# Patient Record
Sex: FEMALE | Race: WHITE | NOT HISPANIC OR LATINO | URBAN - METROPOLITAN AREA
[De-identification: names, ages, dates, MRNs, and addresses within clinical notes are randomized per-mention and may not be internally consistent; named-entity substitution may affect disease eponyms.]

---

## 2021-04-28 LAB
D AG BLD QL: POSITIVE
EXTERNAL ABO: NORMAL
HBV SURFACE AG SER QL: NONREACTIVE
RUBELLA IGG SCREEN: NORMAL

## 2021-10-13 ENCOUNTER — HOSPITAL ENCOUNTER (OUTPATIENT)
Facility: HOSPITAL | Age: 31
End: 2021-10-13
Attending: OBSTETRICS & GYNECOLOGY | Admitting: OBSTETRICS & GYNECOLOGY
Payer: COMMERCIAL

## 2021-11-18 LAB — GP B STREP SPEC QL CULT: NORMAL

## 2021-12-05 ENCOUNTER — PREP FOR CASE (OUTPATIENT)
Dept: SCHEDULING | Age: 31
End: 2021-12-05
Payer: COMMERCIAL

## 2021-12-05 RX ORDER — ACETAMINOPHEN 650 MG/1
650 SUPPOSITORY RECTAL ONCE
Status: CANCELLED | OUTPATIENT
Start: 2021-12-05 | End: 2021-12-05

## 2021-12-05 RX ORDER — METHYLERGONOVINE MALEATE 0.2 MG/ML
200 INJECTION INTRAVENOUS ONCE AS NEEDED
Status: CANCELLED | OUTPATIENT
Start: 2021-12-05

## 2021-12-05 RX ORDER — SODIUM CITRATE AND CITRIC ACID MONOHYDRATE 334; 500 MG/5ML; MG/5ML
30 SOLUTION ORAL ONCE
Status: CANCELLED | OUTPATIENT
Start: 2021-12-05 | End: 2021-12-05

## 2021-12-05 RX ORDER — OXYTOCIN/0.9 % SODIUM CHLORIDE 40/1000ML
PLASTIC BAG, INJECTION (ML) INTRAVENOUS CONTINUOUS
Status: CANCELLED | OUTPATIENT
Start: 2021-12-05 | End: 2021-12-05

## 2021-12-05 RX ORDER — OXYTOCIN/0.9 % SODIUM CHLORIDE 40/1000ML
500 PLASTIC BAG, INJECTION (ML) INTRAVENOUS ONCE
Status: CANCELLED | OUTPATIENT
Start: 2021-12-05 | End: 2021-12-05

## 2021-12-05 RX ORDER — ACETAMINOPHEN 325 MG/1
975 TABLET ORAL ONCE
Status: CANCELLED | OUTPATIENT
Start: 2021-12-05 | End: 2021-12-05

## 2021-12-05 RX ORDER — SODIUM CHLORIDE, SODIUM LACTATE, POTASSIUM CHLORIDE, CALCIUM CHLORIDE 600; 310; 30; 20 MG/100ML; MG/100ML; MG/100ML; MG/100ML
150 INJECTION, SOLUTION INTRAVENOUS CONTINUOUS
Status: CANCELLED | OUTPATIENT
Start: 2021-12-05 | End: 2021-12-12

## 2021-12-05 RX ORDER — OXYTOCIN 10 [USP'U]/ML
10 INJECTION, SOLUTION INTRAMUSCULAR; INTRAVENOUS ONCE AS NEEDED
Status: CANCELLED | OUTPATIENT
Start: 2021-12-05

## 2021-12-05 RX ORDER — MISOPROSTOL 100 UG/1
1000 TABLET ORAL ONCE AS NEEDED
Status: CANCELLED | OUTPATIENT
Start: 2021-12-05

## 2021-12-05 RX ORDER — CARBOPROST TROMETHAMINE 250 UG/ML
250 INJECTION, SOLUTION INTRAMUSCULAR ONCE AS NEEDED
Status: CANCELLED | OUTPATIENT
Start: 2021-12-05

## 2021-12-05 RX ORDER — SODIUM CHLORIDE, SODIUM LACTATE, POTASSIUM CHLORIDE, CALCIUM CHLORIDE 600; 310; 30; 20 MG/100ML; MG/100ML; MG/100ML; MG/100ML
80 INJECTION, SOLUTION INTRAVENOUS CONTINUOUS
Status: CANCELLED | OUTPATIENT
Start: 2021-12-05 | End: 2021-12-06

## 2021-12-05 RX ORDER — ONDANSETRON 4 MG/1
8 TABLET, ORALLY DISINTEGRATING ORAL ONCE
Status: CANCELLED | OUTPATIENT
Start: 2021-12-05 | End: 2021-12-05

## 2021-12-05 NOTE — H&P
Labor and Delivery Admission History and Physical    CC: Scheduled c/s    History: Denisse Valdez is a 31 y.o. female  at 39w0d here for scheduled repeat c/s    Prenatal problems:  1. History of c/s for macrosomia- desires repeat, declines sterilization  2. A1GDM  3. SMA carrier, FOB neg  4. Elevated AFP- normal growth scans  5. Angioedema to tylenol  6. LGA fetus    OB History:   OB History    Para Term  AB Living   1 0 0 0 0 0   SAB IAB Ectopic Multiple Live Births   0 0 0 0 0      # Outcome Date GA Lbr José Miguel/2nd Weight Sex Delivery Anes PTL Lv   1 Current                Medical History: No past medical history on file.    Surgical History: C/S x 1    Social History:   Social History     Socioeconomic History   • Marital status:      Spouse name: Not on file   • Number of children: Not on file   • Years of education: Not on file   • Highest education level: Not on file   Occupational History   • Not on file   Tobacco Use   • Smoking status: Not on file   • Smokeless tobacco: Not on file   Substance and Sexual Activity   • Alcohol use: Not on file   • Drug use: Not on file   • Sexual activity: Not on file   Other Topics Concern   • Not on file   Social History Narrative   • Not on file     Social Determinants of Health     Financial Resource Strain: Not on file   Food Insecurity: Not on file   Transportation Needs: Not on file   Physical Activity: Not on file   Stress: Not on file   Social Connections: Not on file   Intimate Partner Violence: Not on file   Housing Stability: Not on file        Family History: No family history on file.    Allergies: Patient has no allergy information on record.    Prior to Admission medications    Not on File       Review of Systems  Pertinent items are noted in HPI.    Objective     Vital Signs for the last 24 hours:  PDNING    Fetal Monitoring:  PENDING    Exam:  General Appearance: Alert, cooperative, no acute distress  Lungs: Unlabored  breathing  Heart:: RRR  Abdomen: gravid, nontender  Extremities: no edema or calf tenderness  Neurologic: grossly intact without focal deficits    Ultrasounds:   I have reviewed the applicable Ultrasounds.      Labs:  See results console for full labs    Rubella: immune  Rh: pos  GBS: neg    Assessment/Plan     Denisse Valdez is a 31 y.o. female  at 39w0d here for scheduled repeat c/s, LGA fetus. Risks of infection, bleeding, damage to internal organs like bowel, bladder and ureter reviewed. She declines permanent sterilization.    Ancef  Bicitra  Avoid tylenol due to angioedema      Fanny Gonzalez MD

## 2021-12-06 ENCOUNTER — HOSPITAL ENCOUNTER (INPATIENT)
Facility: HOSPITAL | Age: 31
LOS: 2 days | Discharge: HOME | End: 2021-12-08
Payer: COMMERCIAL

## 2021-12-06 ENCOUNTER — ANESTHESIA EVENT (INPATIENT)
Dept: OBSTETRICS AND GYNECOLOGY | Facility: HOSPITAL | Age: 31
End: 2021-12-06
Payer: COMMERCIAL

## 2021-12-06 ENCOUNTER — ANESTHESIA (INPATIENT)
Dept: OBSTETRICS AND GYNECOLOGY | Facility: HOSPITAL | Age: 31
End: 2021-12-06
Payer: COMMERCIAL

## 2021-12-06 DIAGNOSIS — O34.211 MATERNAL CARE DUE TO LOW TRANSVERSE UTERINE SCAR FROM PREVIOUS CESAREAN DELIVERY: ICD-10-CM

## 2021-12-06 PROBLEM — Z98.891 HISTORY OF CESAREAN DELIVERY: Status: ACTIVE | Noted: 2021-12-06

## 2021-12-06 LAB
ABO + RH BLD: NORMAL
AMPHET UR QL SCN: NOT DETECTED
BARBITURATES UR QL SCN: NOT DETECTED
BENZODIAZ UR QL SCN: NOT DETECTED
BLD GP AB SCN SERPL QL: NEGATIVE
BUPRENORPHINE UR QL: NOT DETECTED
CANNABINOIDS UR QL SCN: NOT DETECTED
COCAINE UR QL SCN: NOT DETECTED
D AG BLD QL: POSITIVE
ERYTHROCYTE [DISTWIDTH] IN BLOOD BY AUTOMATED COUNT: 15.9 % (ref 11.7–14.4)
ETHANOL UR-MCNC: NEGATIVE MG/DL
GLUCOSE BLD-MCNC: 103 MG/DL (ref 70–99)
HCT VFR BLDCO AUTO: 31 % (ref 35–45)
HGB BLD-MCNC: 9.3 G/DL (ref 11.8–15.7)
LABORATORY COMMENT REPORT: NORMAL
MCH RBC QN AUTO: 24.1 PG (ref 28–33.2)
MCHC RBC AUTO-ENTMCNC: 30 G/DL (ref 32.2–35.5)
MCV RBC AUTO: 80.3 FL (ref 83–98)
METHADONE UR QL SCN: NOT DETECTED
OPIATES UR QL SCN: NOT DETECTED
OXYCODONE UR QL SCN: NOT DETECTED
PCP UR QL SCN: NOT DETECTED
PDW BLD AUTO: 10.6 FL (ref 9.4–12.3)
PLATELET # BLD AUTO: 261 K/UL (ref 150–369)
POCT TEST: ABNORMAL
RBC # BLD AUTO: 3.86 M/UL (ref 3.93–5.22)
SARS-COV-2 RNA RESP QL NAA+PROBE: NEGATIVE
SPECIMEN EXP DATE BLD: NORMAL
WBC # BLD AUTO: 9.32 K/UL (ref 3.8–10.5)

## 2021-12-06 PROCEDURE — 80307 DRUG TEST PRSMV CHEM ANLYZR: CPT

## 2021-12-06 PROCEDURE — 25800000 HC PHARMACY IV SOLUTIONS: Performed by: STUDENT IN AN ORGANIZED HEALTH CARE EDUCATION/TRAINING PROGRAM

## 2021-12-06 PROCEDURE — 25000000 HC PHARMACY GENERAL

## 2021-12-06 PROCEDURE — 86780 TREPONEMA PALLIDUM: CPT

## 2021-12-06 PROCEDURE — 63600000 HC DRUGS/DETAIL CODE

## 2021-12-06 PROCEDURE — 63700000 HC SELF-ADMINISTRABLE DRUG

## 2021-12-06 PROCEDURE — U0002 COVID-19 LAB TEST NON-CDC: HCPCS

## 2021-12-06 PROCEDURE — 71000011 HC PACU PHASE 1 EA ADDL MIN

## 2021-12-06 PROCEDURE — 71000001 HC PACU PHASE 1 INITIAL 30MIN

## 2021-12-06 PROCEDURE — 0UB10ZZ EXCISION OF LEFT OVARY, OPEN APPROACH: ICD-10-PCS

## 2021-12-06 PROCEDURE — 37000010 HC ANESTHESIA SPINAL

## 2021-12-06 PROCEDURE — 63600000 HC DRUGS/DETAIL CODE: Performed by: STUDENT IN AN ORGANIZED HEALTH CARE EDUCATION/TRAINING PROGRAM

## 2021-12-06 PROCEDURE — 88307 TISSUE EXAM BY PATHOLOGIST: CPT

## 2021-12-06 PROCEDURE — 36415 COLL VENOUS BLD VENIPUNCTURE: CPT

## 2021-12-06 PROCEDURE — 25800000 HC PHARMACY IV SOLUTIONS

## 2021-12-06 PROCEDURE — 72000021 HC C SECTION LEVEL 1

## 2021-12-06 PROCEDURE — 12000000 HC ROOM AND CARE MED/SURG

## 2021-12-06 PROCEDURE — 25000000 HC PHARMACY GENERAL: Performed by: STUDENT IN AN ORGANIZED HEALTH CARE EDUCATION/TRAINING PROGRAM

## 2021-12-06 PROCEDURE — G0480 DRUG TEST DEF 1-7 CLASSES: HCPCS

## 2021-12-06 PROCEDURE — 85027 COMPLETE CBC AUTOMATED: CPT

## 2021-12-06 PROCEDURE — 27200121 HC CATH FOLEY

## 2021-12-06 PROCEDURE — 86901 BLOOD TYPING SEROLOGIC RH(D): CPT

## 2021-12-06 RX ORDER — ONDANSETRON 8 MG/1
8 TABLET, ORALLY DISINTEGRATING ORAL ONCE
Status: DISCONTINUED | OUTPATIENT
Start: 2021-12-06 | End: 2021-12-06

## 2021-12-06 RX ORDER — AMOXICILLIN 250 MG
1 CAPSULE ORAL 2 TIMES DAILY
Status: DISCONTINUED | OUTPATIENT
Start: 2021-12-06 | End: 2021-12-08 | Stop reason: HOSPADM

## 2021-12-06 RX ORDER — ALUMINUM HYDROXIDE, MAGNESIUM HYDROXIDE, AND SIMETHICONE 1200; 120; 1200 MG/30ML; MG/30ML; MG/30ML
30 SUSPENSION ORAL EVERY 4 HOURS PRN
Status: DISCONTINUED | OUTPATIENT
Start: 2021-12-06 | End: 2021-12-08 | Stop reason: HOSPADM

## 2021-12-06 RX ORDER — SODIUM CITRATE AND CITRIC ACID MONOHYDRATE 334; 500 MG/5ML; MG/5ML
30 SOLUTION ORAL ONCE
Status: COMPLETED | OUTPATIENT
Start: 2021-12-06 | End: 2021-12-06

## 2021-12-06 RX ORDER — DIPHENHYDRAMINE HCL 50 MG/ML
25 VIAL (ML) INJECTION EVERY 6 HOURS PRN
Status: DISCONTINUED | OUTPATIENT
Start: 2021-12-06 | End: 2021-12-08 | Stop reason: HOSPADM

## 2021-12-06 RX ORDER — SODIUM CHLORIDE, SODIUM GLUCONATE, SODIUM ACETATE, POTASSIUM CHLORIDE AND MAGNESIUM CHLORIDE 30; 37; 368; 526; 502 MG/100ML; MG/100ML; MG/100ML; MG/100ML; MG/100ML
INJECTION, SOLUTION INTRAVENOUS CONTINUOUS PRN
Status: DISCONTINUED | OUTPATIENT
Start: 2021-12-06 | End: 2021-12-06 | Stop reason: SURG

## 2021-12-06 RX ORDER — DEXAMETHASONE SODIUM PHOSPHATE 4 MG/ML
INJECTION, SOLUTION INTRA-ARTICULAR; INTRALESIONAL; INTRAMUSCULAR; INTRAVENOUS; SOFT TISSUE AS NEEDED
Status: DISCONTINUED | OUTPATIENT
Start: 2021-12-06 | End: 2021-12-06 | Stop reason: SURG

## 2021-12-06 RX ORDER — DIPHENHYDRAMINE HCL 25 MG
25 CAPSULE ORAL EVERY 6 HOURS PRN
Status: DISCONTINUED | OUTPATIENT
Start: 2021-12-06 | End: 2021-12-08 | Stop reason: HOSPADM

## 2021-12-06 RX ORDER — CALCIUM CARBONATE 200(500)MG
500 TABLET,CHEWABLE ORAL EVERY 4 HOURS PRN
Status: DISCONTINUED | OUTPATIENT
Start: 2021-12-06 | End: 2021-12-08 | Stop reason: HOSPADM

## 2021-12-06 RX ORDER — PROCHLORPERAZINE EDISYLATE 5 MG/ML
10 INJECTION INTRAMUSCULAR; INTRAVENOUS EVERY 6 HOURS PRN
Status: ACTIVE | OUTPATIENT
Start: 2021-12-06 | End: 2021-12-07

## 2021-12-06 RX ORDER — OXYTOCIN/0.9 % SODIUM CHLORIDE 40/1000ML
PLASTIC BAG, INJECTION (ML) INTRAVENOUS CONTINUOUS
Status: DISCONTINUED | OUTPATIENT
Start: 2021-12-06 | End: 2021-12-06 | Stop reason: HOSPADM

## 2021-12-06 RX ORDER — ONDANSETRON HYDROCHLORIDE 2 MG/ML
4 INJECTION, SOLUTION INTRAVENOUS EVERY 8 HOURS PRN
Status: DISCONTINUED | OUTPATIENT
Start: 2021-12-06 | End: 2021-12-08 | Stop reason: HOSPADM

## 2021-12-06 RX ORDER — ONDANSETRON 4 MG/1
4 TABLET, ORALLY DISINTEGRATING ORAL EVERY 8 HOURS PRN
Status: DISCONTINUED | OUTPATIENT
Start: 2021-12-06 | End: 2021-12-08 | Stop reason: HOSPADM

## 2021-12-06 RX ORDER — NALOXONE HYDROCHLORIDE 0.4 MG/ML
0.04 INJECTION, SOLUTION INTRAMUSCULAR; INTRAVENOUS; SUBCUTANEOUS AS NEEDED
Status: ACTIVE | OUTPATIENT
Start: 2021-12-06 | End: 2021-12-07

## 2021-12-06 RX ORDER — ONDANSETRON HYDROCHLORIDE 2 MG/ML
4 INJECTION, SOLUTION INTRAVENOUS EVERY 6 HOURS PRN
Status: ACTIVE | OUTPATIENT
Start: 2021-12-06 | End: 2021-12-07

## 2021-12-06 RX ORDER — MORPHINE SULFATE 0.5 MG/ML
INJECTION, SOLUTION EPIDURAL; INTRATHECAL; INTRAVENOUS
Status: COMPLETED | OUTPATIENT
Start: 2021-12-06 | End: 2021-12-06

## 2021-12-06 RX ORDER — CEFAZOLIN SODIUM 2 G/100ML
2 INJECTION, SOLUTION INTRAVENOUS
Status: COMPLETED | OUTPATIENT
Start: 2021-12-06 | End: 2021-12-06

## 2021-12-06 RX ORDER — OXYTOCIN/0.9 % SODIUM CHLORIDE 40/1000ML
500 PLASTIC BAG, INJECTION (ML) INTRAVENOUS ONCE
Status: COMPLETED | OUTPATIENT
Start: 2021-12-06 | End: 2021-12-06

## 2021-12-06 RX ORDER — BUPIVACAINE HYDROCHLORIDE 7.5 MG/ML
INJECTION, SOLUTION INTRASPINAL
Status: COMPLETED | OUTPATIENT
Start: 2021-12-06 | End: 2021-12-06

## 2021-12-06 RX ORDER — OXYTOCIN 10 [USP'U]/ML
INJECTION, SOLUTION INTRAMUSCULAR; INTRAVENOUS AS NEEDED
Status: DISCONTINUED | OUTPATIENT
Start: 2021-12-06 | End: 2021-12-06 | Stop reason: SURG

## 2021-12-06 RX ORDER — SODIUM CHLORIDE, SODIUM LACTATE, POTASSIUM CHLORIDE, CALCIUM CHLORIDE 600; 310; 30; 20 MG/100ML; MG/100ML; MG/100ML; MG/100ML
150 INJECTION, SOLUTION INTRAVENOUS CONTINUOUS
Status: DISCONTINUED | OUTPATIENT
Start: 2021-12-06 | End: 2021-12-06 | Stop reason: HOSPADM

## 2021-12-06 RX ORDER — ONDANSETRON HYDROCHLORIDE 2 MG/ML
INJECTION, SOLUTION INTRAVENOUS AS NEEDED
Status: DISCONTINUED | OUTPATIENT
Start: 2021-12-06 | End: 2021-12-06 | Stop reason: SURG

## 2021-12-06 RX ORDER — DIPHENHYDRAMINE HCL 50 MG/ML
12.5 VIAL (ML) INJECTION EVERY 6 HOURS PRN
Status: ACTIVE | OUTPATIENT
Start: 2021-12-06 | End: 2021-12-07

## 2021-12-06 RX ORDER — NALOXONE HYDROCHLORIDE 0.4 MG/ML
0.1 INJECTION, SOLUTION INTRAMUSCULAR; INTRAVENOUS; SUBCUTANEOUS EVERY 6 HOURS PRN
Status: ACTIVE | OUTPATIENT
Start: 2021-12-06 | End: 2021-12-07

## 2021-12-06 RX ORDER — KETOROLAC TROMETHAMINE 30 MG/ML
30 INJECTION, SOLUTION INTRAMUSCULAR; INTRAVENOUS
Status: COMPLETED | OUTPATIENT
Start: 2021-12-06 | End: 2021-12-08

## 2021-12-06 RX ORDER — IBUPROFEN 600 MG/1
600 TABLET ORAL
Status: DISCONTINUED | OUTPATIENT
Start: 2021-12-08 | End: 2021-12-08 | Stop reason: HOSPADM

## 2021-12-06 RX ORDER — SODIUM CHLORIDE, SODIUM LACTATE, POTASSIUM CHLORIDE, CALCIUM CHLORIDE 600; 310; 30; 20 MG/100ML; MG/100ML; MG/100ML; MG/100ML
80 INJECTION, SOLUTION INTRAVENOUS CONTINUOUS
Status: DISCONTINUED | OUTPATIENT
Start: 2021-12-06 | End: 2021-12-06 | Stop reason: HOSPADM

## 2021-12-06 RX ORDER — OXYCODONE HYDROCHLORIDE 5 MG/1
5 TABLET ORAL EVERY 4 HOURS PRN
Status: DISCONTINUED | OUTPATIENT
Start: 2021-12-06 | End: 2021-12-08 | Stop reason: HOSPADM

## 2021-12-06 RX ADMIN — CEFAZOLIN SODIUM 2 G: 10 POWDER, FOR SOLUTION INTRAVENOUS at 08:30

## 2021-12-06 RX ADMIN — SODIUM CHLORIDE, SODIUM GLUCONATE, SODIUM ACETATE, POTASSIUM CHLORIDE AND MAGNESIUM CHLORIDE: 526; 502; 368; 37; 30 INJECTION, SOLUTION INTRAVENOUS at 09:05

## 2021-12-06 RX ADMIN — SODIUM CHLORIDE, POTASSIUM CHLORIDE, SODIUM LACTATE AND CALCIUM CHLORIDE 150 ML/HR: 600; 310; 30; 20 INJECTION, SOLUTION INTRAVENOUS at 06:10

## 2021-12-06 RX ADMIN — ONDANSETRON 4 MG: 2 INJECTION INTRAMUSCULAR; INTRAVENOUS at 09:16

## 2021-12-06 RX ADMIN — BUPIVACAINE HYDROCHLORIDE IN DEXTROSE 1.6 ML: 7.5 INJECTION, SOLUTION SUBARACHNOID at 08:40

## 2021-12-06 RX ADMIN — KETOROLAC TROMETHAMINE 30 MG: 30 INJECTION, SOLUTION INTRAMUSCULAR at 22:45

## 2021-12-06 RX ADMIN — DEXAMETHASONE SODIUM PHOSPHATE 4 MG: 4 INJECTION, SOLUTION INTRA-ARTICULAR; INTRALESIONAL; INTRAMUSCULAR; INTRAVENOUS; SOFT TISSUE at 09:16

## 2021-12-06 RX ADMIN — SODIUM CITRATE AND CITRIC ACID MONOHYDRATE 30 ML: 500; 334 SOLUTION ORAL at 08:22

## 2021-12-06 RX ADMIN — SODIUM CHLORIDE, POTASSIUM CHLORIDE, SODIUM LACTATE AND CALCIUM CHLORIDE 80 ML/HR: 600; 310; 30; 20 INJECTION, SOLUTION INTRAVENOUS at 11:54

## 2021-12-06 RX ADMIN — MORPHINE SULFATE 0.2 MG: 0.5 INJECTION, SOLUTION EPIDURAL; INTRATHECAL; INTRAVENOUS at 08:40

## 2021-12-06 RX ADMIN — OXYTOCIN 20 UNITS: 10 INJECTION, SOLUTION INTRAMUSCULAR; INTRAVENOUS at 09:15

## 2021-12-06 RX ADMIN — Medication 20 UNITS: at 09:14

## 2021-12-06 RX ADMIN — SODIUM CHLORIDE 50 MCG/MIN: 9 INJECTION, SOLUTION INTRAVENOUS at 08:35

## 2021-12-06 RX ADMIN — KETOROLAC TROMETHAMINE 30 MG: 30 INJECTION, SOLUTION INTRAMUSCULAR at 10:37

## 2021-12-06 RX ADMIN — SENNOSIDES AND DOCUSATE SODIUM 1 TABLET: 50; 8.6 TABLET ORAL at 20:33

## 2021-12-06 RX ADMIN — KETOROLAC TROMETHAMINE 30 MG: 30 INJECTION, SOLUTION INTRAMUSCULAR at 17:21

## 2021-12-06 ASSESSMENT — PATIENT HEALTH QUESTIONNAIRE - PHQ9: SUM OF ALL RESPONSES TO PHQ9 QUESTIONS 1 & 2: 0

## 2021-12-06 NOTE — ANESTHESIA POSTPROCEDURE EVALUATION
Patient: Denisse Valdez    Procedure Summary     Date: 21 Room / Location:  L&D 1 / [facility] L[facility] OR    Anesthesia Start: 833 Anesthesia Stop: 954    Procedure:  SECTION - JUAREZ 2021 (N/A Abdomen) Diagnosis:       Maternal care due to low transverse uterine scar from previous  delivery      (Repeat O34.21)    Surgeons: Fanny Florez MD Responsible Provider: Ramon Fournier MD    Anesthesia Type: spinal ASA Status: 2          Anesthesia Type: spinal  PACU Vitals    No data found in the last 10 encounters.           Anesthesia Post Evaluation    Pain management: adequate  Patient participation: complete - patient participated  Level of consciousness: awake and alert  Cardiovascular status: acceptable  Airway Patency: adequate  Respiratory status: acceptable  Hydration status: acceptable  Pain well controlled after spinal preservative free morphine administration: Yes  Continue 24 hours observation after preservative free morphine administration: Yes  Anesthetic complications: no

## 2021-12-06 NOTE — ANESTHESIA PROCEDURE NOTES
Spinal Block    Patient location during procedure: OB  Start time: 12/6/2021 8:35 AM  End time: 12/6/2021 8:40 AM  Staffing  Performed: anesthesiologist   Anesthesiologist: Ramon Fournier MD  Reason for block: labor analgesia requested by patient and obstetrician  Preanesthetic Checklist  Completed: patient identified, surgical consent, pre-op evaluation, timeout performed, IV checked, risks and benefits discussed, monitors and equipment checked and sterile field maintained during procedure  Spinal Block  Patient position: sitting  Prep: ChloraPrep and site prepped and draped  Patient monitoring: heart rate, cardiac monitor, continuous pulse ox and blood pressure  Approach: midline  Location: L3-4  Injection technique: single-shot  Needle  Needle type: Sprotte   Needle gauge: 24 G  Needle length: 3.5 in  Assessment  Sensory level: T4  Events: cerebrospinal fluid  Additional Notes  Procedure well tolerated. Vital signs stable.    Medications Administered - 12/6/2021 8:40 AM   Bupivacaine PF (MARCAINE SPINAL) 0.75% intrathecal injection, 1.6 mL  morphine (DURAMORPH) PF injection 0.5 mg/mL, 0.2 mg

## 2021-12-06 NOTE — ANESTHESIA PREPROCEDURE EVALUATION
Anesthesia ROS/MED HX    Anesthesia History    Previous anesthetics  No family history of anesthetic complications  No history of anesthetic complications  Pulmonary - neg  Neuro/Psych - neg  Cardiovascular- neg  Endo/Other   Diabetes      Relevant Problems   No relevant active problems       Physical Exam    Airway   Mallampati: II   TM distance: >3 FB   Neck ROM: full  Cardiovascular - normal   Rhythm: regular   Rate: normalPulmonary - normal   clear to auscultation  Dental - normal        Anesthesia Plan    Plan: spinal    Technique: spinal     Airway: natural airway / supplemental oxygen   ASA 2  Anesthetic plan and risks discussed with: patient  Postop Plan:   Patient Disposition: inpatient floor planned admission

## 2021-12-06 NOTE — ANESTHESIOLOGIST PRE-PROCEDURE ATTESTATION
Pre-Procedure Patient Identification:  I am the Primary Anesthesiologist and have identified the patient on 12/06/21 at 6:57 AM.   I have confirmed the procedure(s) will be performed by the following surgeon/proceduralist Fanny Florez MD.

## 2021-12-06 NOTE — OP NOTE
Date: 2021    Patient name: Denisse Valdez    MRN: [id number]    Procedure performed:   1. Repeat low transverse  section  2. Left ovarian cystectomy    Preoperative diagnoses:  1. 39w0d week intrauterine pregnancy  2. History of prior  delivery  3. LGA fetus on US    Postoperative diagnoses:  1. 39w0d week intrauterine pregnancy  2. History of prior  delivery  3. Left dermoid cyst    Anesthesia: Spinal    EBL:800 cc  UOP: 150 clear urine    Intraoperative findings:  1. Female infant in cephalic presentation, weight 8lbs 12 oz, apgars 8/9  2. Normal appearing uterus, fallopian tubes, right ovarie  3. Left dermoid cyst, 4 cm  3. Small adhesive disease at the level of rectus     Indications: The patient is a 31 y.o.  at 39w0d who presented for scheduled repeat  delivery. She declined TOLAC and permanent sterilization. Risks of infection, bleeding, damage to internal organs were reviewed and consent was signed.    Procedure in detail: The patient was taken to the operating room where spinal anesthesia was found to be adequate. She was placed in supine position with left lateral tilt. Mayen catheter placed. She was prepped and draped in usual sterile fashion. Pfannenstiel skin incision was made 2 cm superior to the pubic bone and carried down to the level of the fascia with the knife. Fascia was incised in the midline and extended sharply. Kocher clamps were used to grasp the anterior aspect of the fascia and underlying rectus muscles were dissected off bluntly and sharply. Inferior aspect of the fascia was grasped with Kocher clamps and underlying muscles dissected off sharply. Peritoneum entered in the midline bluntly and extended with good visualization of the bladder. Bladder blade was inserted and bladder flap created with Metzenbaum scissors. Low transverse hysterotomy was made with the knife and extended in cephalocaudad direction bluntly. Amniotomy performed for  clear fluid. Infant head brought to hysterotomy and delivered atraumatically, followed by shoulders and rest of body. Cord was cut and clamped after 1 minute.  Uterus exteriorized and cleared of clot and debris. The hysterotomy was closed in 2 layers- first a running locked stitch with 0 Vicryl, followed by imbricating layer with 0 Monocryl. The hysterotomy appeared hemostatic and the uterus was returned to the abdomen. It was again inspected and no bleeding was noted. The left ovary had a clearly dermoid cyst (fluid and fat were identified) and this was removed with Ligasure device as the border between cyst and normal ovarian tissue was easy to identify.    Gutters clear. Rectus and fascia inspected and no bleeding noted. Fascia closed with 0 PDS in running fashion. Subcutaneous tissue irrigated and closed with 2-0 plain gut. Skin closed with 4-0 Monocryl in subcuticular fashion.    The patient tolerated the procedure well. Instrument, sponge and needle counts were correct.    Fanny Gonzalez MD

## 2021-12-07 PROBLEM — Z98.891 S/P REPEAT LOW TRANSVERSE C-SECTION: Status: ACTIVE | Noted: 2021-12-07

## 2021-12-07 LAB
CASE RPRT: NORMAL
CLINICAL INFO: NORMAL
ERYTHROCYTE [DISTWIDTH] IN BLOOD BY AUTOMATED COUNT: 16.1 % (ref 11.7–14.4)
HCT VFR BLDCO AUTO: 27 % (ref 35–45)
HGB BLD-MCNC: 8.3 G/DL (ref 11.8–15.7)
MCH RBC QN AUTO: 24.6 PG (ref 28–33.2)
MCHC RBC AUTO-ENTMCNC: 30.7 G/DL (ref 32.2–35.5)
MCV RBC AUTO: 79.9 FL (ref 83–98)
PATH REPORT.FINAL DX SPEC: NORMAL
PATH REPORT.GROSS SPEC: NORMAL
PATH REPORT.MICROSCOPIC SPEC OTHER STN: NORMAL
PDW BLD AUTO: 9.7 FL (ref 9.4–12.3)
PLATELET # BLD AUTO: 227 K/UL (ref 150–369)
RBC # BLD AUTO: 3.38 M/UL (ref 3.93–5.22)
T PALLIDUM AB SER QL IF: NONREACTIVE
WBC # BLD AUTO: 11.4 K/UL (ref 3.8–10.5)

## 2021-12-07 PROCEDURE — 36415 COLL VENOUS BLD VENIPUNCTURE: CPT

## 2021-12-07 PROCEDURE — 63600000 HC DRUGS/DETAIL CODE

## 2021-12-07 PROCEDURE — 12000000 HC ROOM AND CARE MED/SURG

## 2021-12-07 PROCEDURE — 27200121 HC CATH FOLEY

## 2021-12-07 PROCEDURE — 85027 COMPLETE CBC AUTOMATED: CPT

## 2021-12-07 PROCEDURE — 63700000 HC SELF-ADMINISTRABLE DRUG

## 2021-12-07 RX ORDER — IBUPROFEN 600 MG/1
600 TABLET ORAL EVERY 6 HOURS PRN
Qty: 90 TABLET | Refills: 1 | Status: SHIPPED | OUTPATIENT
Start: 2021-12-07 | End: 2021-12-17

## 2021-12-07 RX ORDER — AMOXICILLIN 250 MG
1 CAPSULE ORAL 2 TIMES DAILY
Qty: 90 TABLET | Refills: 0 | Status: SHIPPED | OUTPATIENT
Start: 2021-12-07 | End: 2022-01-06

## 2021-12-07 RX ADMIN — SENNOSIDES AND DOCUSATE SODIUM 1 TABLET: 50; 8.6 TABLET ORAL at 21:01

## 2021-12-07 RX ADMIN — KETOROLAC TROMETHAMINE 30 MG: 30 INJECTION, SOLUTION INTRAMUSCULAR at 04:23

## 2021-12-07 RX ADMIN — SENNOSIDES AND DOCUSATE SODIUM 1 TABLET: 50; 8.6 TABLET ORAL at 10:55

## 2021-12-07 RX ADMIN — KETOROLAC TROMETHAMINE 30 MG: 30 INJECTION, SOLUTION INTRAMUSCULAR at 17:05

## 2021-12-07 RX ADMIN — PRENATAL VITAMINS-IRON FUMARATE 27 MG IRON-FOLIC ACID 0.8 MG TABLET 1 TABLET: at 10:55

## 2021-12-07 RX ADMIN — KETOROLAC TROMETHAMINE 30 MG: 30 INJECTION, SOLUTION INTRAMUSCULAR at 22:51

## 2021-12-07 RX ADMIN — KETOROLAC TROMETHAMINE 30 MG: 30 INJECTION, SOLUTION INTRAMUSCULAR at 10:55

## 2021-12-07 NOTE — DISCHARGE INSTRUCTIONS
"After Your Delivery Discharge Instructions    After Discharge Orders:  Call [phone] to schedule an appointment for your 6 week postpartum visit.     Home medications:  · You may take ibuprofen 400 mg - 600 mg every 6 hours as needed for pain.   · You may also take Tylenol 650 mg every 6 hours.   · For pain that is unrelieved with either ibuprofen and tylenol, you may take oxycodone 1-2 tablets every 4-6 hours as needed.   · If you experience constipation, you can take stool softeners like Colace or a laxative such as Miralax.       Call the Physician with any  signs and symptoms:    Warning signs regarding incision:  · \"Popping\" of stitches or staples  · Foul smelling discharge or pus  · More redness or streaks around incision than before    Incision care:  · Keep incision uncovered  · No tub baths   · You may use cold compresses on incision site     After your delivery - signs and symptoms to watch for:  · Fever - Oral temperature greater than 100.4 degrees Fahrenheit  · Foul-smelling vaginal discharge  · Headache unrelieved by \"pain meds\"  · Difficulty urinating  · Breasts reddened, hard, hot to the touch  · Nipple discharge which is foul-smelling or contains pus  · Increased pain at the site of the surgical incision  · Difficulty breathing with or without chest pain  · New calf pain especially if only on one side  · Sudden, continuing increased vaginal bleeding with or without clots  · Unrelieved feelings of:  · Inability to cope  · Sadness  · Anxiety  · Lack of interest in baby  · Insomnia  · Crying     What to do at home:  · See patient education handouts for full information  · Resume activity gradually   · Do not drive while on narcotic pain medication  · Don't lift anything heavier than 10 lbs for 6 weeks  · No sex for 6 weeks  · Take care of yourself by sleeping/resting as much as possible  · Eat regular nutritious meals  · Let someone else care for you, your baby, and housework as much as " possible   · Take pain medication as prescribed whenever you need them  · Wear compression stockings if prescribed   · To avoid/relieve constipation take stool softeners if advised   · Drink lots of water/fruit juices  · Increase fiber in your diet  · Breast care: Wear support bra ; use lanolin ointment/cream, nipple shields or cool compresses as needed     Refer to [facility] Discharge Instructions for problems or follow-up regarding  feeding or nursing

## 2021-12-07 NOTE — LACTATION NOTE
This note was copied from a baby's chart.  P2 Baby observed bf well.Reviewed Bf Basics, output, positioning and feeding frequency.Reviewed nipple care.Maternal hydration and nutrition discussed. Lactation team will follow.

## 2021-12-07 NOTE — DISCHARGE SUMMARY
Inpatient Discharge Summary    BRIEF OVERVIEW  Admission Provider: Fanny Gonzalez MD   Discharge Provider: Radha Chou MD    Admission Date: 2021       Discharge Date: 2021    Discharge Diagnosis  Postpartum state  S/p repeat  section and left ovarian cystectomy for dermoid cyst    Discharge Disposition  Home    Discharge Medications     Medication List      START taking these medications    ibuprofen 600 mg tablet  Commonly known as: MOTRIN  Take 1 tablet (600 mg total) by mouth every 6 (six) hours as needed for moderate pain for up to 10 days.  Dose: 600 mg     sennosides-docusate sodium 8.6-50 mg  Commonly known as: SENOKOT-S  Take 1 tablet by mouth 2 (two) times a day.  Dose: 1 tablet        CONTINUE taking these medications    prenatal vit no.130-iron-folic 27 mg iron- 800 mcg tablet tablet  Take 1 tablet by mouth daily.  Dose: 1 tablet            Outpatient Follow-Up  Postpartum followup in 6 weeks        DETAILS OF HOSPITAL STAY      Denisse Valdez is a  who presented to L&D and had Labor/Delivery:  (history of c/s x 1). She had a concurrent left ovarian cystectomy for 4cm dermoid. She had a routine postpartum course and was discharged in stable condition. Discharge instructions were reviewed in detail. She was encouraged to continue PO iron for asymptomatic bloodloss anemia.    She will follow up in 6 weeks.     Relevant consults: none  Relevant labs: none

## 2021-12-07 NOTE — PROGRESS NOTES
Obstetrics Postpartum Progress Note    Events  Storm replaced this morning due to inability to void.    Subjective  Pain: well controlled  Bleeding: lochia minimal  Diet: taking regular diet  Voiding: storm in situ  Ambulating: as tolerated  Feeding: plans to breastfeed    Vitals  Temp:  [36.2 °C (97.2 °F)-37.1 °C (98.8 °F)] 36.6 °C (97.9 °F)  Heart Rate:  [] 82  Resp:  [16-18] 18  BP: (102-118)/(54-72) 109/70    Body mass index is 30.87 kg/m².    I&O    Intake/Output Summary (Last 24 hours) at 2021 0805  Last data filed at 2021 0400  Gross per 24 hour   Intake 1750 ml   Output 2340 ml   Net -590 ml       Physical Exam  General: well  Abdomen: soft, nondistended, appropriately tender  Incision: c/d/I, bandage in place  Fundus: firm and at umbilicus  Extremities: symmetric, no edema bilaterally    Labs  Labs Reviewed:  Lab Results   Component Value Date    ABO A 2021    LABRH Positive 2021        Assessment/Plan   Problem-based Assessment and Plan    Denisse Valdez is a 31 y.o.  postop day 1 s/p rLTCS for history of c/s x 1 and left ovarian cystectomy for dermoid cyst    - Meeting post-op milestones  - Hgb 9.3 pre-op -> 8.3 post-op, no signs of symptomatic anemia. Encouraged PO iron upon discharge  - Cont routine post-op advancement. Void trial later this evening  - Encouraged ambulation/OOB  - Anticipate discharge home on POD#2  - Reviewed all discharge instructions, precautions, follow-up appointments, discharge medications. All questions answered.    Madhavi Akers MD  2021 8:05 AM

## 2021-12-07 NOTE — PLAN OF CARE
Problem: Adult Inpatient Plan of Care  Goal: Plan of Care Review  Outcome: Progressing  Flowsheets (Taken 2021 1852)  Progress: improving  Plan of Care Reviewed With:   patient   spouse  Outcome Summary: VSS. pain controlled with toradol. breastfeeding well. amb in room showered. storm DC'd at 1630 DTV within 6 hours. confident and capable of self and  care.   Plan of Care Review  Plan of Care Reviewed With: patient,spouse  Progress: improving  Outcome Summary: VSS. pain controlled with toradol. breastfeeding well. amb in room showered. storm DC'd at 1630 DTV within 6 hours. confident and capable of self and  care.

## 2021-12-08 VITALS
BODY MASS INDEX: 30.28 KG/M2 | SYSTOLIC BLOOD PRESSURE: 112 MMHG | HEART RATE: 81 BPM | OXYGEN SATURATION: 98 % | HEIGHT: 66 IN | TEMPERATURE: 97.9 F | DIASTOLIC BLOOD PRESSURE: 58 MMHG | RESPIRATION RATE: 16 BRPM | WEIGHT: 188.4 LBS

## 2021-12-08 PROCEDURE — 63700000 HC SELF-ADMINISTRABLE DRUG

## 2021-12-08 PROCEDURE — 63700000 HC SELF-ADMINISTRABLE DRUG: Performed by: OBSTETRICS & GYNECOLOGY

## 2021-12-08 PROCEDURE — 63600000 HC DRUGS/DETAIL CODE

## 2021-12-08 RX ORDER — NYSTATIN 100000 [USP'U]/G
OINTMENT TOPICAL 2 TIMES DAILY
Status: DISCONTINUED | OUTPATIENT
Start: 2021-12-08 | End: 2021-12-08 | Stop reason: HOSPADM

## 2021-12-08 RX ADMIN — SENNOSIDES AND DOCUSATE SODIUM 1 TABLET: 50; 8.6 TABLET ORAL at 08:17

## 2021-12-08 RX ADMIN — KETOROLAC TROMETHAMINE 30 MG: 30 INJECTION, SOLUTION INTRAMUSCULAR at 04:56

## 2021-12-08 RX ADMIN — KETOROLAC TROMETHAMINE 30 MG: 30 INJECTION, SOLUTION INTRAMUSCULAR at 11:09

## 2021-12-08 RX ADMIN — MUPIROCIN: 20 OINTMENT TOPICAL at 12:21

## 2021-12-08 RX ADMIN — PRENATAL VITAMINS-IRON FUMARATE 27 MG IRON-FOLIC ACID 0.8 MG TABLET 1 TABLET: at 08:17

## 2021-12-08 NOTE — NURSING NOTE
Reviewed discharge instructions in detail copies of same given.  Aware to follow up with OBGYN in 6 weeks sooner with questions or concerns.

## 2021-12-08 NOTE — NURSING NOTE
Discharged stable ambulatory with  and spouse aware to follow up with OBGYN in 6 weeks sooner with questions or concerns

## 2021-12-08 NOTE — PROGRESS NOTES
Obstetrics Postpartum Progress Note  POD#2 s/p RLTCS    Events  No acute events overnight.    Subjective  Pain: Appropriate, controlled  Bleeding: lochia minimal  Diet: taking regular diet  Voiding: without difficulty  Bowel: passing flatus  Ambulating: as tolerated    Vitals  Temp:  [36.6 °C (97.9 °F)-37.2 °C (99 °F)] 36.6 °C (97.9 °F)  Heart Rate:  [81-88] 81  Resp:  [16] 16  BP: (112-129)/(58-59) 112/58    I&O    Intake/Output Summary (Last 24 hours) at 2021 1512  Last data filed at 2021 2100  Gross per 24 hour   Intake --   Output 2225 ml   Net -2225 ml       Physical Exam  General: well  Heart: Regular rate and rhythm  Lungs: Unlabored breathing  Abdomen: soft, minimally TTP without r/g. Min distension and tympany  Fundus: firm and below umbilicus  Incision: dressing clean, dry, intact  Perineum: deferred  Extremities: symmetric    Labs  Labs Reviewed:  Lab Results   Component Value Date    ABO A 2021    LABRH Positive 2021      Rubella: immune    Problem-based Assessment and Plan    Denisse Valdez is a 31 y.o.  postop day 2 s/p , Low Transverse .    1. Vital Signs: stable  2. Hemodynamics: stable; Hgb   Lab Results   Component Value Date    HGB 8.3 (L) 2021    POD#1. UOP is excellent  3. Pain: controlled  4. VTE Assessment: Early Ambulation, SCDs  5. Vaccinations/Rhogam: rhogam not indicated   6. Post care: meeting all goals     Plan: Routine care; anticipate discharge on POD#2    Radha Chou MD  2021  3:12 PM

## 2021-12-08 NOTE — PLAN OF CARE
Plan of Care Review  Plan of Care Reviewed With: patient,spouse  VSS  breastfeeding with good positioning inc NADYA well approximated without redness or edema.  Scant lochia   Pt desires early discharge

## 2021-12-08 NOTE — LACTATION NOTE
This note was copied from a baby's chart.  Mom is sore today, Assessed nipples, triple nipple ordered by RN. She wanted to take break from latching so she just pumped over an ounce of colostrum and Dad will feed to baby next feed.

## 2023-09-27 ENCOUNTER — TELEPHONE (OUTPATIENT)
Dept: GASTROENTEROLOGY | Facility: CLINIC | Age: 33
End: 2023-09-27

## 2023-09-27 ENCOUNTER — OFFICE VISIT (OUTPATIENT)
Dept: GASTROENTEROLOGY | Facility: CLINIC | Age: 33
End: 2023-09-27
Payer: COMMERCIAL

## 2023-09-27 VITALS
BODY MASS INDEX: 24.32 KG/M2 | HEIGHT: 65 IN | DIASTOLIC BLOOD PRESSURE: 72 MMHG | WEIGHT: 146 LBS | SYSTOLIC BLOOD PRESSURE: 104 MMHG

## 2023-09-27 DIAGNOSIS — Z86.010 PERSONAL HISTORY OF COLONIC POLYPS: Primary | ICD-10-CM

## 2023-09-27 DIAGNOSIS — R10.13 EPIGASTRIC PAIN: ICD-10-CM

## 2023-09-27 PROCEDURE — 99214 OFFICE O/P EST MOD 30 MIN: CPT | Performed by: INTERNAL MEDICINE

## 2023-09-27 NOTE — PROGRESS NOTES
[facility] - Outpatient Follow-up Note  Mateo Nails 28 y.o. female MRN: [id number]  Encounter: [id number]    ASSESSMENT AND PLAN:      1. Personal history of colonic polyps  Adenomas on 2 prior colonoscopies most recent November 2018. Will arrange surveillance colonoscopy    2. Epigastric pain  About 1 year of intermittent crampy generalized abdominal pain, without specific triggers. Recent ultrasound reportedly showed sludge and question gastritis. Relief but not resolution with short courses of omeprazole  We will plan upper endoscopy to assess for gastritis/peptic ulcer disease at the time of upcoming colonoscopy. I prescribed Levsin as needed in the interim    Followup Appointment: Pending EGD/colonoscopy  ______________________________________________________________________    Chief Complaint   Patient presents with   • Due for colonoscopy     HPI: The patient presents for follow-up on a personal history of colon polyps and a new complaint of abdominal pain. She was last seen in the office about 5 years ago. She denies any lower GI complaints. Colonoscopy in 2018 showed an adenoma and she is ready to schedule follow-up. Since her last visit she had 2 pregnancies and now has a 11year-old and a 3year-old. During pregnancy she had heartburn particularly with acidic foods. About a year ago she began to experience intermittent abdominal contractions described as a twisting pain that last about an hour. It is not associated with certain foods. It is not related to diarrhea or any other changes in the bowel cycle. She had an ultrasound for PCP that showed sludge and question gastritis. She has taken intermittent doses of a PPI without significant change in symptoms. She denies NSAIDs or other risk factors for peptic ulcer disease.   Historical Information   Past Medical History:   Diagnosis Date   • Anemia    • Colon polyp    • Gallbladder polyp      Past Surgical History:   Procedure Laterality Date   •  SECTION       Social History     Substance and Sexual Activity   Alcohol Use Never     Social History     Substance and Sexual Activity   Drug Use Never     Social History     Tobacco Use   Smoking Status Never   Smokeless Tobacco Never     Family History   Problem Relation Age of Onset   • Lymphoma Mother    • Cancer Mother    • Colon polyps Mother    • Depression Mother    • Cancer Maternal Grandmother          Current Outpatient Medications:   •  escitalopram (LEXAPRO) 10 mg tablet  •  hyoscyamine (LEVSIN/SL) 0.125 mg SL tablet  Allergies   Allergen Reactions   • Acetaminophen Anaphylaxis   • Other Rash     Aloe     Reviewed medications and allergies and updated as indicated    PHYSICAL EXAM:    Blood pressure 104/72, height 5' 5" (1.651 m), weight 66.2 kg (146 lb), unknown if currently breastfeeding. Body mass index is 24.3 kg/m². General Appearance: NAD, cooperative, alert  Eyes: Anicteric, PERRLA, EOMI  ENT:  Normocephalic, atraumatic, normal mucosa. Neck:  Supple, symmetrical, trachea midline  Resp:  Clear to auscultation bilaterally; no rales, rhonchi or wheezing; respirations unlabored   CV:  S1 S2, Regular rate and rhythm; no murmur, rub, or gallop. GI:  Soft, non-tender, non-distended; normal bowel sounds; no masses, no organomegaly   Rectal: Deferred  Musculoskeletal: No cyanosis, clubbing or edema. Normal ROM.   Skin:  No jaundice, rashes, or lesions   Heme/Lymph: No palpable cervical lymphadenopathy  Psych: Normal affect, good eye contact  Neuro: No gross deficits, AAOx3    Lab Results:   No results found for: "WBC", "HGB", "HCT", "MCV", "PLT"  No results found for: "NA", "K", "CL", "CO2", "ANIONGAP", "BUN", "CREATININE", "GLUCOSE", "GLUF", "CALCIUM", "CORRECTEDCA", "AST", "ALT", "ALKPHOS", "PROT", "BILITOT", "EGFR"  No results found for: "IRON", "TIBC", "FERRITIN"  No results found for: "LIPASE"    Radiology Results:   XR hip/pelv 2-3 vws right if performed    Result Date: 9/26/2023  Narrative: Initial report created on 9/26/2023 2:13:57 PM EDT PROCEDURE INFORMATION: Exam: XR Right Hip Exam date and time: 9/25/2023 1:45 PM Age: 28years old Clinical indication: Pain in right hip; Pain in right knee TECHNIQUE: Imaging protocol: Radiologic exam of the right hip. Views: 2 or 3 views hip with pelvis when performed. COMPARISON: No relevant prior studies available. FINDINGS: Bones/joints: Unremarkable. No acute fracture. Soft tissues: Unremarkable. Impression: No acute findings. THIS DOCUMENT HAS BEEN ELECTRONICALLY SIGNED BY Irina Briseno MD    XR foot 3+ vw right    Result Date: 9/26/2023  Narrative: Initial report created on 9/26/2023 2:12:23 PM EDT PROCEDURE INFORMATION: Exam: XR Right Foot Exam date and time: 9/25/2023 1:43 PM Age: 28years old Clinical indication: Pain in right knee TECHNIQUE: Imaging protocol: Radiologic exam of the right foot. Views: 3 or more views. COMPARISON: DX XR KNEE RT ROUTINE 4 VWS OR MORE 9/25/2023 1:39 PM FINDINGS: Bones/joints: Normal. Soft tissues: Normal.    Impression: No acute findings. THIS DOCUMENT HAS BEEN ELECTRONICALLY SIGNED BY Irina Briseno MD    XR knee 4+ vw right injury    Result Date: 9/26/2023  Narrative: History: Pain in the right knee. Comments:  AP, lateral, and bilateral oblique views of the right knee were obtained. Please note that a true lateral view was not obtained limiting evaluation of the patellofemoral compartment. No priors. There is no evidence of fracture nor dislocation. There is no knee effusion. Verdie Uribe is very mild joint space narrowing in the medial compartment of the right knee. Impression: Impression: Minimal changes of osteoarthritis in the right knee. See limitations of exam as above.  [facility] Dictation By:  Manuel Saldaña MD  This report has been electronically signed by:  Manuel Saldaña MD  9/26/2023 8:21 AM  Answers for HPI/ROS submitted by the patient on 9/20/2023  Chronicity: chronic  Onset: more than 1 year ago  Onset quality: sudden  Frequency: intermittently  Episode duration: 6 Hours  Progression since onset: unchanged  Pain location: generalized abdominal region  Pain - numeric: 9/10  Pain quality: cramping, sharp  Radiates to: suprapubic region  anorexia: Yes  arthralgias: No  belching: No  constipation: Yes  diarrhea: No  dysuria: No  fever: Yes  flatus: No  frequency: No  headaches: Yes  hematochezia: No  hematuria: No  melena: No  myalgias: Yes  nausea: Yes  weight loss: No  vomiting: No  Aggravated by: being still, certain positions, eating  Relieved by: nothing

## 2023-09-27 NOTE — TELEPHONE ENCOUNTER
Scheduled date of Combo (as of today): 11/10/23  Physician performing colonoscopy: Dr. Angela Hoover  Location of colonoscopy:[facility]  Bowel prep reviewed with patient: Miralax  Instructions reviewed with patient by: Natacha Hughes  Clearances: none

## 2023-10-27 ENCOUNTER — ANESTHESIA (OUTPATIENT)
Dept: ANESTHESIOLOGY | Facility: AMBULATORY SURGERY CENTER | Age: 33
End: 2023-10-27

## 2023-10-27 ENCOUNTER — ANESTHESIA EVENT (OUTPATIENT)
Dept: ANESTHESIOLOGY | Facility: AMBULATORY SURGERY CENTER | Age: 33
End: 2023-10-27

## 2023-12-14 ENCOUNTER — ANESTHESIA EVENT (OUTPATIENT)
Dept: ANESTHESIOLOGY | Facility: AMBULATORY SURGERY CENTER | Age: 33
End: 2023-12-14

## 2023-12-14 ENCOUNTER — ANESTHESIA (OUTPATIENT)
Dept: ANESTHESIOLOGY | Facility: AMBULATORY SURGERY CENTER | Age: 33
End: 2023-12-14

## 2023-12-27 ENCOUNTER — TELEPHONE (OUTPATIENT)
Age: 33
End: 2023-12-27

## 2023-12-27 NOTE — TELEPHONE ENCOUNTER
Scheduled date of EGD/colonoscopy (as of today):02/15/2024  Physician performing EGD/colonoscopy:Dr. Kenny  Location of EGD/colonoscopy:[facility] [place]      Notified Brittany from the Endo center of procedure cancellation.

## 2024-02-01 ENCOUNTER — ANESTHESIA EVENT (OUTPATIENT)
Dept: ANESTHESIOLOGY | Facility: AMBULATORY SURGERY CENTER | Age: 34
End: 2024-02-01

## 2024-02-01 ENCOUNTER — ANESTHESIA (OUTPATIENT)
Dept: ANESTHESIOLOGY | Facility: AMBULATORY SURGERY CENTER | Age: 34
End: 2024-02-01

## 2024-02-05 ENCOUNTER — TELEPHONE (OUTPATIENT)
Dept: GASTROENTEROLOGY | Facility: CLINIC | Age: 34
End: 2024-02-05

## 2024-02-08 ENCOUNTER — TELEPHONE (OUTPATIENT)
Dept: GASTROENTEROLOGY | Facility: CLINIC | Age: 34
End: 2024-02-08

## 2024-02-08 NOTE — TELEPHONE ENCOUNTER
Procedure confirmed  Colonoscopy/Endoscopy     Via: Voice mail    Instructions given: Given to Patient at Visit     Prep Given: Miralax/Dulcolax    Call the office if there are any questions.

## 2024-02-13 ENCOUNTER — TELEPHONE (OUTPATIENT)
Dept: GASTROENTEROLOGY | Facility: AMBULATORY SURGERY CENTER | Age: 34
End: 2024-02-13

## 2024-02-13 NOTE — PROGRESS NOTES
Lm to call back to review instructions and confirm 8:00 AM arrival time for 2/15 EGD and Colonoscopy

## 2024-02-14 NOTE — TELEPHONE ENCOUNTER
Scheduled date of EGD/colonoscopy (as of today):03/20/2024  Physician performing EGD/colonoscopy:Dr Kenny  [facility] of EGD[facility]colonoscopy[facility]   Desired bowel prep reviewed with patient: miralax/dul  Instructions reviewed with patient by:sent via my chart   Clearances:  n/a

## 2024-03-06 ENCOUNTER — ANESTHESIA EVENT (OUTPATIENT)
Dept: ANESTHESIOLOGY | Facility: AMBULATORY SURGERY CENTER | Age: 34
End: 2024-03-06

## 2024-03-06 ENCOUNTER — ANESTHESIA (OUTPATIENT)
Dept: ANESTHESIOLOGY | Facility: AMBULATORY SURGERY CENTER | Age: 34
End: 2024-03-06

## 2024-03-18 ENCOUNTER — TELEPHONE (OUTPATIENT)
Age: 34
End: 2024-03-18

## 2024-05-03 ENCOUNTER — ANESTHESIA (OUTPATIENT)
Dept: ANESTHESIOLOGY | Facility: AMBULATORY SURGERY CENTER | Age: 34
End: 2024-05-03

## 2024-05-03 ENCOUNTER — ANESTHESIA EVENT (OUTPATIENT)
Dept: ANESTHESIOLOGY | Facility: AMBULATORY SURGERY CENTER | Age: 34
End: 2024-05-03

## 2024-05-15 ENCOUNTER — TELEPHONE (OUTPATIENT)
Dept: GASTROENTEROLOGY | Facility: AMBULATORY SURGERY CENTER | Age: 34
End: 2024-05-15

## 2024-05-17 ENCOUNTER — ANESTHESIA EVENT (OUTPATIENT)
Dept: GASTROENTEROLOGY | Facility: AMBULATORY SURGERY CENTER | Age: 34
End: 2024-05-17

## 2024-05-17 ENCOUNTER — ANESTHESIA (OUTPATIENT)
Dept: GASTROENTEROLOGY | Facility: AMBULATORY SURGERY CENTER | Age: 34
End: 2024-05-17

## 2024-05-17 ENCOUNTER — HOSPITAL ENCOUNTER (OUTPATIENT)
Dept: GASTROENTEROLOGY | Facility: AMBULATORY SURGERY CENTER | Age: 34
Discharge: HOME | End: 2024-05-17
Payer: COMMERCIAL

## 2024-05-17 VITALS
BODY MASS INDEX: 23.32 KG/M2 | WEIGHT: 140 LBS | RESPIRATION RATE: 16 BRPM | SYSTOLIC BLOOD PRESSURE: 116 MMHG | DIASTOLIC BLOOD PRESSURE: 76 MMHG | HEART RATE: 86 BPM | TEMPERATURE: 97.5 F | OXYGEN SATURATION: 100 % | HEIGHT: 65 IN

## 2024-05-17 DIAGNOSIS — Z86.010 PERSONAL HISTORY OF COLONIC POLYPS: ICD-10-CM

## 2024-05-17 DIAGNOSIS — R10.13 EPIGASTRIC PAIN: ICD-10-CM

## 2024-05-17 LAB
EXT PREGNANCY TEST URINE: NEGATIVE
EXT. CONTROL: NORMAL

## 2024-05-17 PROCEDURE — G0105 COLORECTAL SCRN; HI RISK IND: HCPCS | Performed by: INTERNAL MEDICINE

## 2024-05-17 PROCEDURE — 43239 EGD BIOPSY SINGLE/MULTIPLE: CPT | Performed by: INTERNAL MEDICINE

## 2024-05-17 PROCEDURE — 88305 TISSUE EXAM BY PATHOLOGIST: CPT | Performed by: PATHOLOGY

## 2024-05-17 RX ORDER — PROPOFOL 10 MG/ML
INJECTION, EMULSION INTRAVENOUS AS NEEDED
Status: DISCONTINUED | OUTPATIENT
Start: 2024-05-17 | End: 2024-05-17

## 2024-05-17 RX ORDER — SODIUM CHLORIDE, SODIUM LACTATE, POTASSIUM CHLORIDE, CALCIUM CHLORIDE 600; 310; 30; 20 MG/100ML; MG/100ML; MG/100ML; MG/100ML
50 INJECTION, SOLUTION INTRAVENOUS CONTINUOUS
Status: DISCONTINUED | OUTPATIENT
Start: 2024-05-17 | End: 2024-05-21 | Stop reason: HOSPADM

## 2024-05-17 RX ADMIN — SODIUM CHLORIDE, SODIUM LACTATE, POTASSIUM CHLORIDE, CALCIUM CHLORIDE: 600; 310; 30; 20 INJECTION, SOLUTION INTRAVENOUS at 11:03

## 2024-05-17 RX ADMIN — PROPOFOL 50 MG: 10 INJECTION, EMULSION INTRAVENOUS at 11:15

## 2024-05-17 RX ADMIN — PROPOFOL 50 MG: 10 INJECTION, EMULSION INTRAVENOUS at 11:11

## 2024-05-17 RX ADMIN — PROPOFOL 50 MG: 10 INJECTION, EMULSION INTRAVENOUS at 11:19

## 2024-05-17 RX ADMIN — PROPOFOL 50 MG: 10 INJECTION, EMULSION INTRAVENOUS at 11:23

## 2024-05-17 RX ADMIN — PROPOFOL 150 MG: 10 INJECTION, EMULSION INTRAVENOUS at 11:10

## 2024-05-17 RX ADMIN — PROPOFOL 50 MG: 10 INJECTION, EMULSION INTRAVENOUS at 11:13

## 2024-05-17 NOTE — H&P
"History and Physical -  [facility]  Mona Aparicio 34 y.o. female MRN: [id number]    HPI: Mona Aparicio is a 34 y.o. year old female who presents for personal history of polyps, epigastric pain    REVIEW OF SYSTEMS: Per the HPI, and otherwise unremarkable.    Historical Information   Past Medical History:   Diagnosis Date    Anemia     Colon polyp     Diabetes mellitus (HCC)     gestional diabetes    Gallbladder polyp      Past Surgical History:   Procedure Laterality Date     SECTION      KNEE ARTHROSCOPY W/ MENISCAL REPAIR Right      Social History   Social History     Substance and Sexual Activity   Alcohol Use Yes    Comment: social     Social History     Substance and Sexual Activity   Drug Use Never     Social History     Tobacco Use   Smoking Status Never   Smokeless Tobacco Never     Family History   Problem Relation Age of Onset    Lymphoma Mother     Cancer Mother     Colon polyps Mother     Depression Mother     Cancer Maternal Grandmother        Meds/Allergies       Current Outpatient Medications:     hyoscyamine (LEVSIN/SL) 0.125 mg SL tablet    Current Facility-Administered Medications:     lactated ringers infusion, 50 mL/hr, Intravenous, Continuous    Allergies   Allergen Reactions    Acetaminophen Anaphylaxis    Other Rash     Aloe       Objective     /79   Pulse 73   Temp 97.5 °F (36.4 °C) (Temporal)   Resp 20   Ht 5' 5\" (1.651 m)   Wt 63.5 kg (140 lb)   LMP 2024 (Exact Date)   SpO2 96%   Breastfeeding No   BMI 23.30 kg/m²     PHYSICAL EXAM    Gen: NAD AAOx3  Head: Normocephalic, Atraumatic  CV: S1S2 RRR no m/r/g  CHEST: Clear b/l no c/r/w  ABD: soft, +BS NT/ND  EXT: no edema    ASSESSMENT/PLAN:  This is a 34 y.o. year old female here for EGD/colonoscopy, and she is stable and optimized for her procedure.        "

## 2024-05-17 NOTE — ANESTHESIA POSTPROCEDURE EVALUATION
Post-Op Assessment Note    CV Status:  Stable  Pain Score: 0    Pain management: adequate       Mental Status:  Sleepy   Hydration Status:  Euvolemic   PONV Controlled:  Controlled   Airway Patency:  Patent     Post Op Vitals Reviewed: Yes    No anethesia notable event occurred.    Staff: Anesthesiologist, CRNA               BP   91/52   Temp      Pulse  71   Resp   18   SpO2   99

## 2024-05-17 NOTE — ANESTHESIA PREPROCEDURE EVALUATION
Procedure:  EGD  COLONOSCOPY    Relevant Problems   ANESTHESIA (within normal limits)      CARDIO (within normal limits)      GYN   (-) Currently pregnant      PULMONARY (within normal limits)   (-) Sleep apnea   (-) Smoking   (-) URI (upper respiratory infection)      Physical Exam    Airway    Mallampati score: I  TM Distance: >3 FB  Neck ROM: full     Dental   No notable dental hx     Cardiovascular      Pulmonary      Other Findings  post-pubertal.      Anesthesia Plan  ASA Score- 1     Anesthesia Type- IV sedation with anesthesia with ASA Monitors.         Additional Monitors:     Airway Plan:            Plan Factors-Exercise tolerance (METS): >4 METS.    Chart reviewed.   Existing labs reviewed. Patient summary reviewed.    Patient is not a current smoker.              Induction- intravenous.    Postoperative Plan-         Informed Consent- Anesthetic plan and risks discussed with patient.  I personally reviewed this patient with the CRNA. Discussed and agreed on the Anesthesia Plan with the CRNA..

## 2024-05-20 ENCOUNTER — TELEPHONE (OUTPATIENT)
Age: 34
End: 2024-05-20

## 2024-05-20 NOTE — TELEPHONE ENCOUNTER
Patients GI provider:  Dr. Kenny    Number to return call: ([phone]    Reason for call: Pt called to speak to Dr Kenny regarding having her gall bladder removed. The notes from the ER are in the pt's chart. Please have Dr Kenny reach out to pt    Scheduled procedure/appointment date if applicable: Apt/procedure N/A

## 2024-05-21 PROCEDURE — 88305 TISSUE EXAM BY PATHOLOGIST: CPT | Performed by: PATHOLOGY

## 2024-05-21 NOTE — TELEPHONE ENCOUNTER
SPOKE WITH PT, REVIEWED EGD/COLONOSCOPY BIOPSY RESULTS. PT IS CURRENTLY SCHEDULED FOR GALLBLADDER REMOVAL THIS THURSDAY AT [facility]. PT WOULD LIKE TO DISCUSS WITH YOU WHEN AVAILABLE. THANK YOU.

## 2024-05-21 NOTE — TELEPHONE ENCOUNTER
Reviewed records in care everywhere and discussed with patient by phone.  She had abdominal pain on Sunday so she went to the ER.  CT showed stones in the gallbladder neck.  LFTs were normal.  There was stool in the right colon and air in the transverse colon.     She followed up with surgery today.  She had epigastric/right upper quadrant tenderness and cholecystectomy was recommended.  This is scheduled for Thursday.  Based upon clinical picture I agree with cholecystectomy.  Patient will keep me posted postoperatively, and contact me immediately if any new GI symptoms develop.

## 2024-05-21 NOTE — TELEPHONE ENCOUNTER
Tried to call patient but reached voicemail, I did not leave a message, I will call again after office hours.

## 2025-03-26 ENCOUNTER — CLINICAL SUPPORT (OUTPATIENT)
Dept: GENETICS | Facility: CLINIC | Age: 35
End: 2025-03-26
Attending: INTERNAL MEDICINE
Payer: COMMERCIAL

## 2025-03-26 DIAGNOSIS — Z80.3 FHX: BREAST CANCER: ICD-10-CM

## 2025-03-26 DIAGNOSIS — Z71.83 ENCOUNTER FOR NONPROCREATIVE GENETIC COUNSELING: Primary | ICD-10-CM

## 2025-03-26 PROCEDURE — 96041 GENETIC COUNSELING SVC EA 30: CPT | Performed by: GENETIC COUNSELOR, MS

## 2025-03-26 PROCEDURE — 36415 COLL VENOUS BLD VENIPUNCTURE: CPT | Performed by: GENETIC COUNSELOR, MS

## 2025-03-26 NOTE — PROGRESS NOTES
Patient Name: Denisse Valdez  Patient Legal Name: Denisse Valdez  : 1990           Indication for Appointment:  Denisse was seen for genetic counseling and risk assessment at [facility] [place] due to a family history of breast and colon cancer. Denisse was referred by BROOKE Berman  and came to the session with her son .    Personal History:   Denisse is a 34 y.o. female  with primary visit diagnosis of Encounter for nonprocreative genetic counseling [Z71.83].      Past Medical History:   Diagnosis Date    Anemia     iron supplements    Colon polyp     several polyps since age 24, screening q5yrs, some precancerous    Gestational diabetes     no meds    Screening for colon cancer       Past Medical History Pertinent Negatives:   Denies History Of: Date Noted    Abnormal Pap smear of cervix 2021    Asthma 2021    Blood disorder 2021    Disease of thyroid gland 2021    Female infertility 2021    Fibroid 2021    Hypertension 2021    Hyperthyroidism 2021    Hypothyroidism 2021    Kidney stone 2021    Screening for breast cancer 2025     Past Surgical History:   Procedure Laterality Date    [place] tooth extraction       Surgical History[1]    Gynecologic History:  Menarche Age: 16 years  Age at first live birth: 28  Menopause Status: Pre-Menopause  Use of hormonal contraceptives: No  Use of fertility medications: No  Use of hormone replacement therapy: No  Use of Tamoxifen/Evista: No    Social History     Tobacco Use    Smoking status: Never    Smokeless tobacco: Never   Vaping Use    Vaping status: Never Used   Substance Use Topics    Alcohol use: Never    Drug use: Not Currently       Family History:  See family history below.           Genetic Education/Risk Assessment/Counseling:  Basic genetics education was provided to Denisse.  An overview of the connection between genes and health was discussed. We reviewed  how genes can be passed down in families and how this may apply to Denisse's personal and family history. Related social and emotional topics were also reviewed. This discussion was based on the information given by Denisse at this time.       Discussion of Genetic Testing:  The pros, cons and limitations of genetic testing were discussed. We reviewed test types and possible test results. We also reviewed how results may change health care, emotional and social issues, and insurance questions. What is known about these topics is growing and may change over time. The chance to have a genetic risk was reviewed, if applicable. Denisse was assisted with decision making as needed.      Denisse chose to have the following genetic testing.      [facility]  Common Hereditary Cancers Panel with Leukemia and Lymphoma Panel      Plan:  A sample was collected and will be sent to the genetic testing laboratory. A consent form from the laboratory was given to Denisse.    Denisse will be contacted after genetic test results are available.  Some testing laboratories may release results directly to patients.  Guidelines for health care will be reviewed at that time if appropriate.    Resources for additional information and support are available for her in the After Visit Summary. Consent was obtained to send note(s) with [facility] providers.  Denisse should contact the program at [phone] with personal/family history updates.    34 minutes were spent on this date of service performing the following activities: preparing for visit, reviewing records, independently reviewing studies, obtaining history, coordinating care, providing counseling and education, and documenting.              [1] No past surgical history pertinent negatives on file.

## 2025-03-26 NOTE — LETTER
03/26/25    BROOKE Levin  [address]  [address]  [place] [address]    Re:  Patient Preferred Name: Denisse Valdez  Patient Legal Name: Denisse Valdez    Dear Dr. Khan,    I am writing to confirm that your patient, Denisse Valdez, received care through my office on 03/26/25. I have enclosed a summary of the care provided to Denisse for your reference.    Please contact me with any questions you may have regarding the visit.    Sincerely,           JOY Woods      CC: No Recipients

## 2025-03-26 NOTE — LETTER
03/26/25    Denisse Valdez  [address] [address]    Dear Ms. Valdez,    Thank you for participating in the [facility] Risk Assessment and Genetics Program.  It was a pleasure working with you. Attached is documentation from our discussion(s). It will also be sent to any physicians you indicated.      You may also choose to share this documentation with your family members. Because cancer risk is based on both personal and both maternal and paternal family history factors, as well as mutation status, your relatives are recommended to review their risks and genetic testing options (if applicable) with their own healthcare providers to derive a risk-appropriate, individualized plan.      If you have any questions, concerns, or updates to your personal/family history, please contact the [facility] Risk Assessment and Genetics Program at [phone] to further review your case.    Please see below for your encounter report.    Sincerely,         Chas [facility]        Encounter Note    Indication for Appointment:  Denisse was seen for genetic counseling and risk assessment at [facility] [place] due to a family history of breast and colon cancer. Denisse was referred by BROOKE Berman  and came to the session with her son .    Personal History:   Denisse is a 34 y.o. female  with primary visit diagnosis of Encounter for nonprocreative genetic counseling [Z71.83].      Past Medical History:   Diagnosis Date    Anemia     iron supplements    Colon polyp     several polyps since age 24, screening q5yrs, some precancerous    Gestational diabetes     no meds    Screening for colon cancer       Past Medical History Pertinent Negatives:   Denies History Of: Date Noted    Abnormal Pap smear of cervix 12/06/2021    Asthma 12/06/2021    Blood disorder 12/06/2021    Disease of thyroid gland 12/06/2021    Female infertility 12/06/2021    Fibroid 12/06/2021     Hypertension 12/06/2021    Hyperthyroidism 12/06/2021    Hypothyroidism 12/06/2021    Kidney stone 12/06/2021    Screening for breast cancer 03/26/2025     Past Surgical History:   Procedure Laterality Date    [place] tooth extraction       Surgical History[1]    Gynecologic History:  Menarche Age: 16 years  Age at first live birth: 28  Menopause Status: Pre-Menopause  Use of hormonal contraceptives: No  Use of fertility medications: No  Use of hormone replacement therapy: No  Use of Tamoxifen/Evista: No    Social History     Tobacco Use    Smoking status: Never    Smokeless tobacco: Never   Vaping Use    Vaping status: Never Used   Substance Use Topics    Alcohol use: Never    Drug use: Not Currently       Family History:  See family history below.           Genetic Education/Risk Assessment/Counseling:  Basic genetics education was provided to Denisse.  An overview of the connection between genes and health was discussed. We reviewed how genes can be passed down in families and how this may apply to Denisse's personal and family history. Related social and emotional topics were also reviewed. This discussion was based on the information given by Denisse at this time.       Discussion of Genetic Testing:  The pros, cons and limitations of genetic testing were discussed. We reviewed test types and possible test results. We also reviewed how results may change health care, emotional and social issues, and insurance questions. What is known about these topics is growing and may change over time. The chance to have a genetic risk was reviewed, if applicable. Denisse was assisted with decision making as needed.      Denisse chose to have the following genetic testing.      [facility]  Common Hereditary Cancers Panel with Leukemia and Lymphoma Panel      Plan:  A sample was collected and will be sent to the genetic testing laboratory. A consent form from the laboratory was given to Denisse.    Denisse will be contacted  after genetic test results are available.  Some testing laboratories may release results directly to patients.  Guidelines for health care will be reviewed at that time if appropriate.    Resources for additional information and support are available for her in the After Visit Summary. Consent was obtained to send note(s) with [facility] providers.  Denisse should contact the program at [phone] with personal/family history updates.    34 minutes were spent on this date of service performing the following activities: preparing for visit, reviewing records, independently reviewing studies, obtaining history, coordinating care, providing counseling and education, and documenting.     [1] No past surgical history pertinent negatives on file.

## 2025-04-08 ENCOUNTER — TELEPHONE (OUTPATIENT)
Dept: GENETICS | Age: 35
End: 2025-04-08
Payer: COMMERCIAL

## 2025-04-08 NOTE — LETTER
04/19/25    Denisse Valdez  [address] [address]    Dear Ms. Valdez,    Thank you for participating in the [facility] Risk Assessment [facility] Genetics Program.  It was a pleasure working with you. Attached is documentation from our discussion(s). It will also be sent to any physicians you indicated.      You may also choose to share this documentation with your family members. Because cancer risk is based on both personal and both maternal and paternal family history factors, as well as mutation status, your relatives are recommended to review their risks and genetic testing options (if applicable) with their own healthcare providers to derive a risk-appropriate, individualized plan.      If you have any questions, concerns, or updates to your personal/family history, please contact the [facility] Risk Assessment and Genetics Program at [phone] to further review your case.    Please see below for your encounter report.    Sincerely,         Chas [facility], [facility]        Encounter Note       Indication for Appointment:  Denisse Valdez was seen by the Cancer Risk Assessment and Genetics Program at [facility] [place] due to family history of breast and colon cancer. Genetic testing was performed.    Denisse Salcedo was contacted by telephone today to review genetic test results.  Results, risk assessment and management guidelines are below.    Genetic Test Results:    RESULT:    Negative- No Pathogenic Sequence Variants Identified    LAB/TEST:  [facility]  Common Hereditary Cancers Panel with Leukemia and Lymphoma panel            After receiving consent, the following result(s) were disclosed to Denisse:   -No reportable variants were identified by this laboratory.  This is called an indeterminate negative result.  The current test cannot  all possible variants in the genes. It is also possible a variant could be in a gene that was not tested.  Biological family members may have a variation in any of the genes tested.     Personal History:   Denisse is 34 y.o. female.     Past Medical History:   Diagnosis Date    Anemia     iron supplements    Colon polyp     several polyps since age 24, screening q5yrs, some precancerous    Gestational diabetes     no meds    Screening for colon cancer      Past Medical History Pertinent Negatives:   Denies History Of: Date Noted    Abnormal Pap smear of cervix 12/06/2021    Asthma 12/06/2021    Blood disorder 12/06/2021    Disease of thyroid gland 12/06/2021    Female infertility 12/06/2021    Fibroid 12/06/2021    Hypertension 12/06/2021    Hyperthyroidism 12/06/2021    Hypothyroidism 12/06/2021    Kidney stone 12/06/2021    Screening for breast cancer 03/26/2025     Past Surgical History:   Procedure Laterality Date    [place] tooth extraction       Surgical History[1]     Gynecologic History:  Menarche Age: 16 years  Age at first live birth: 28  Menopause Status: Pre-Menopause  Use of hormonal contraceptives: No  Use of fertility medications: No  Use of hormone replacement therapy: No  Use of Tamoxifen/Evista: No      Social History     Tobacco Use    Smoking status: Never    Smokeless tobacco: Never   Vaping Use    Vaping status: Never Used   Substance Use Topics    Alcohol use: Never    Drug use: Not Currently       Family History:  See completed family history in pedigree below.             Risk Assessment and Management:     As a clinically actionable mutation was not identified by the current test method(s), the cancer risks and guidelines reviewed are based on the personal and/or family history provided. Of note, the reported history appears consistent with an inherited presentation; risk management guidelines for hereditary-appearing families in the absence of a clinically actionable mutation remain limited.  As guidelines continually change and the efficacy of screening for some cancers remains under  investigation, Densise Valdez is encouraged to review personal and family history with managing physician(s) regularly.    Site of Surveillance Coordinating Provider Recommendation   Breast Oncology, Surgery, Primary Care or Gynecology Breast cancer risk can change with age and other factors and should be reviewed yearly with healthcare providers. Benjamins lifetime risk of developing breast cancer was calculated using the Tyrer-Cuzick model and is estimated to be that of the general population.  This risk may be limited as the family history suggests an inherited presentation.  The National Comprehensive Cancer Network (NCCN) guidelines for breast cancer screening include:   Being aware of any new changes in your breasts and telling your healthcare provider right away.  Yearly clinical breast exam and assessment with healthcare provider starting by age 25.  Yearly mammogram starting by age 40.  Additional screening can be discussed with healthcare provider if breast tissue is found to be heterogenous or extremely dense on mammogram.   Gynecologic Gynecology Yearly visit with provider to review gynecologic care. Pelvic exams are performed based on medical history or symptoms    Gastrointestinal PCP  Gastroenterology Denisse is recommended to continue with colorectal cancer screening as advised by healthcare provider, or sooner if changes in symptoms or health.  Based on the reported family history of colorectal cancer, Matthew lifetime risk of developing colorectal cancer is increased 1.5 fold over that of the general population.  General population risk is approximately 4 - 5%; thus, Matthew risk is 6-7.5%. This may be an underestimation of risk as it does not take into account the Denisse 's personal history of colorectal polyps. Denisse should review this history and risk with gastroenterologist to make a personalized screening plan about when to start screening, how often to have screening and which tests  are best.    Skin PCP  Dermatology Yearly full-body skin exam or as directed by healthcare provider.  Use sun protection like sunscreen, clothing, hats, and UV protective sunglasses and avoid sun during the middle of the day, keep time in the sun short, and avoiid tanning beds.  Awareness of ABCDEs of melanoma (asymmetry, border, color, diameter, evolution).      General Management PCP Yearly check up with a primary care provider.  Review weight with managing healthcare provider.  Eat a diet that includes fruits, vegetables, whole grains, fat-free or low-fat dairy, lean proteins, and healthy oils.  Limit foods and drinks with high salt, unhealthy fats, and added sugars.   Try to get at least 200 minutes of exercise per week. Cardiovascular and strength training should be included in the exercise plan.  Drink less than 1 alcoholic beverage per day. A serving equals 1 ounce of liquor, 6 ounces of wine, or 8 ounces of beer.  Discuss vaccination options with healthcare providers. Some vaccinations such as the HPV vaccine and Hepatitis B vaccine can lower the chances of getting cancer.  Not smoking.         Plan:  Health risks can depend on personal history and on both maternal and paternal family history.  Other family members are encouraged to consider having a cancer risk assessment or genetic testing for their own health planning. If a family member is interested in cancer risk assessment or genetic testing, the Genetics and Risk Assessment Program can help or help them find a genetic counselor nearby.    The information above is based on what we know now and may change with new updates in science and also changes in personal/ family history.  Denisse was helped with decision making as needed.  Resources for additional information and support are available in the after-visit summary.  Consent was obtained to send chart note(s) with healthcare providers.  Denisse should discuss the information above with healthcare  providers to finalize the health care plan.     Denisse should contact the program with any updates to personal or family history.  This could change the plan provided and testing options available. Denisse is encouraged to contact the Genetics program in the future to see if any new information about her case is available, or with any questions or concerns, or to review updated test options and insurance coverage.  The contact number is [phone]).               [1] No past surgical history pertinent negatives on file.

## 2025-04-08 NOTE — LETTER
04/19/25    BROOKE Levin  [address]  [address]  [place] [address]    Re:  Patient Preferred Name: Denisse Valdez  Patient Legal Name: Denisse Valdez    Dear Dr. Khan,    I am writing to confirm that your patient, Denisse Valdez, received care through my office on 04/19/25. I have enclosed a summary of the care provided to Denisse for your reference.    Please contact me with any questions you may have regarding the visit.    Sincerely,           JOY Woods      CC: No Recipients

## 2025-04-08 NOTE — TELEPHONE ENCOUNTER
Patient Name: Denisse Valdez  Patient Legal Name: Denisse Valdez  : 1990           Indication for Appointment:  Denisse Valdez was seen by the Cancer Risk Assessment and Genetics Program at [facility] [place] due to family history of breast and colon cancer. Genetic testing was performed.    Denisse Salcedo was contacted by telephone today to review genetic test results.  Results, risk assessment and management guidelines are below.    Genetic Test Results:    RESULT:    Negative- No Pathogenic Sequence Variants Identified    LAB/TEST:  [employer] [facility]  Common Hereditary Cancers Panel with Leukemia and Lymphoma panel            After receiving consent, the following result(s) were disclosed to Denisse:   -No reportable variants were identified by this laboratory.  This is called an indeterminate negative result.  The current test cannot  all possible variants in the genes. It is also possible a variant could be in a gene that was not tested. Biological family members may have a variation in any of the genes tested.     Personal History:   Denisse is 34 y.o. female.     Past Medical History:   Diagnosis Date    Anemia     iron supplements    Colon polyp     several polyps since age 24, screening q5yrs, some precancerous    Gestational diabetes     no meds    Screening for colon cancer      Past Medical History Pertinent Negatives:   Denies History Of: Date Noted    Abnormal Pap smear of cervix 2021    Asthma 2021    Blood disorder 2021    Disease of thyroid gland 2021    Female infertility 2021    Fibroid 2021    Hypertension 2021    Hyperthyroidism 2021    Hypothyroidism 2021    Kidney stone 2021    Screening for breast cancer 2025     Past Surgical History:   Procedure Laterality Date    [place] tooth extraction       Surgical History[1]     Gynecologic History:  Menarche Age: 16 years  Age at first live birth:  28  Menopause Status: Pre-Menopause  Use of hormonal contraceptives: No  Use of fertility medications: No  Use of hormone replacement therapy: No  Use of Tamoxifen/Evista: No      Social History     Tobacco Use    Smoking status: Never    Smokeless tobacco: Never   Vaping Use    Vaping status: Never Used   Substance Use Topics    Alcohol use: Never    Drug use: Not Currently       Family History:  See completed family history in pedigree below.             Risk Assessment and Management:     As a clinically actionable mutation was not identified by the current test method(s), the cancer risks and guidelines reviewed are based on the personal and/or family history provided. Of note, the reported history appears consistent with an inherited presentation; risk management guidelines for hereditary-appearing families in the absence of a clinically actionable mutation remain limited.  As guidelines continually change and the efficacy of screening for some cancers remains under investigation, Denisse Valdez is encouraged to review personal and family history with managing physician(s) regularly.    Site of Surveillance Coordinating Provider Recommendation   Breast Oncology, Surgery, Primary Care or Gynecology Breast cancer risk can change with age and other factors and should be reviewed yearly with healthcare providers. Denisse's lifetime risk of developing breast cancer was calculated using the Tyrer-Cuzick model and is estimated to be that of the general population.  This risk may be limited as the family history suggests an inherited presentation.  The National Comprehensive Cancer Network (NCCN) guidelines for breast cancer screening include:   Being aware of any new changes in your breasts and telling your healthcare provider right away.  Yearly clinical breast exam and assessment with healthcare provider starting by age 25.  Yearly mammogram starting by age 40.  Additional screening can be discussed with healthcare  provider if breast tissue is found to be heterogenous or extremely dense on mammogram.   Gynecologic Gynecology Yearly visit with provider to review gynecologic care. Pelvic exams are performed based on medical history or symptoms    Gastrointestinal PCP  Gastroenterology Denisse is recommended to continue with colorectal cancer screening as advised by healthcare provider, or sooner if changes in symptoms or health.  Based on the reported family history of colorectal cancer, Matthew lifetime risk of developing colorectal cancer is increased 1.5 fold over that of the general population.  General population risk is approximately 4 - 5%; thus, Matthew risk is 6-7.5%. This may be an underestimation of risk as it does not take into account the Denisse 's personal history of colorectal polyps. Denisse should review this history and risk with gastroenterologist to make a personalized screening plan about when to start screening, how often to have screening and which tests are best.    Skin PCP  Dermatology Yearly full-body skin exam or as directed by healthcare provider.  Use sun protection like sunscreen, clothing, hats, and UV protective sunglasses and avoid sun during the middle of the day, keep time in the sun short, and avoiid tanning beds.  Awareness of ABCDEs of melanoma (asymmetry, border, color, diameter, evolution).      General Management PCP Yearly check up with a primary care provider.  Review weight with managing healthcare provider.  Eat a diet that includes fruits, vegetables, whole grains, fat-free or low-fat dairy, lean proteins, and healthy oils.  Limit foods and drinks with high salt, unhealthy fats, and added sugars.   Try to get at least 200 minutes of exercise per week. Cardiovascular and strength training should be included in the exercise plan.  Drink less than 1 alcoholic beverage per day. A serving equals 1 ounce of liquor, 6 ounces of wine, or 8 ounces of beer.  Discuss vaccination options  with healthcare providers. Some vaccinations such as the HPV vaccine and Hepatitis B vaccine can lower the chances of getting cancer.  Not smoking.         Plan:  Health risks can depend on personal history and on both maternal and paternal family history.  Other family members are encouraged to consider having a cancer risk assessment or genetic testing for their own health planning. If a family member is interested in cancer risk assessment or genetic testing, the Genetics and Risk Assessment Program can help or help them find a genetic counselor nearby.    The information above is based on what we know now and may change with new updates in science and also changes in personal/ family history.  Denisse was helped with decision making as needed.  Resources for additional information and support are available in the after-visit summary.  Consent was obtained to send chart note(s) with healthcare providers.  Denisse should discuss the information above with healthcare providers to finalize the health care plan.     Denisse should contact the program with any updates to personal or family history.  This could change the plan provided and testing options available. Denisse is encouraged to contact the Genetics program in the future to see if any new information about her case is available, or with any questions or concerns, or to review updated test options and insurance coverage.  The contact number is [phone] ([phone]).                                            [1] No past surgical history pertinent negatives on file.

## 2025-04-24 LAB
ABNORMALITY: NORMAL
LYMPH SUBSET INTERP BLD FC-IMP: NORMAL
SCAN RESULT: NORMAL

## (undated) DEVICE — SPONGE LAP 18X18 SAFE-T RFID ENHANCED XRAY

## (undated) DEVICE — SUTURE MONOCRYL 0 Y946H

## (undated) DEVICE — PAD GROUND ELECTROSURGICAL W/CORD

## (undated) DEVICE — DRESSING ABD STERILE 7X8IN

## (undated) DEVICE — DRESSING TELFA 3X8

## (undated) DEVICE — STERILE BLOOD COLLECTION TUBES

## (undated) DEVICE — LIGASURE IMPACT INSTRUMENT

## (undated) DEVICE — APPLICATOR CHLORAPREP 26ML ORANGE TINT

## (undated) DEVICE — ***USE 56937*** SUTURE PLAIN GUT 2-0   843H

## (undated) DEVICE — SPONGE CURITY GAUZE 4

## (undated) DEVICE — PACK RFID MLH C SECTION

## (undated) DEVICE — DRESSING SPONGE GAUZE 4X4 STER

## (undated) DEVICE — ***USE 138841*** SUTURE PDS II  0  Z370T

## (undated) DEVICE — Device

## (undated) DEVICE — SUTURE MONOCRYL 4-0  Y496G PS-2 I8IN

## (undated) DEVICE — PENCIL ESU HANDSWITCHING W/HOL

## (undated) DEVICE — PUMP BOTTLE CAVILON FILM BARRIER NO STING

## (undated) DEVICE — CONTAINER SPECIMEN STERILE 5OZ

## (undated) DEVICE — PENEVAC1 NONSTICK SMOKE EVAC

## (undated) DEVICE — ***USE 138760*** SUTURE VICRYL 0 J370H CTX 36IN VIOLET

## (undated) DEVICE — SYRINGE BULB 60CC

## (undated) DEVICE — ***USE 57698*** SLEEVE FLOWTRON DVT CALF SINGLE USE